# Patient Record
Sex: FEMALE | Race: WHITE | Employment: OTHER | URBAN - METROPOLITAN AREA
[De-identification: names, ages, dates, MRNs, and addresses within clinical notes are randomized per-mention and may not be internally consistent; named-entity substitution may affect disease eponyms.]

---

## 2017-04-17 ENCOUNTER — HOSPITAL ENCOUNTER (EMERGENCY)
Facility: CLINIC | Age: 37
Discharge: HOME | End: 2017-04-17
Attending: FAMILY MEDICINE | Admitting: FAMILY MEDICINE
Payer: MEDICARE

## 2017-04-17 ENCOUNTER — APPOINTMENT (OUTPATIENT)
Dept: CT IMAGING | Facility: CLINIC | Age: 37
End: 2017-04-17
Attending: FAMILY MEDICINE
Payer: MEDICARE

## 2017-04-17 VITALS
RESPIRATION RATE: 16 BRPM | WEIGHT: 142 LBS | TEMPERATURE: 98.6 F | DIASTOLIC BLOOD PRESSURE: 85 MMHG | SYSTOLIC BLOOD PRESSURE: 122 MMHG | OXYGEN SATURATION: 100 % | BODY MASS INDEX: 23.63 KG/M2

## 2017-04-17 DIAGNOSIS — K50.819 CROHN'S DISEASE OF SMALL AND LARGE INTESTINES WITH COMPLICATION (H): ICD-10-CM

## 2017-04-17 LAB
ALBUMIN SERPL-MCNC: 3.2 G/DL (ref 3.4–5)
ALBUMIN UR-MCNC: NEGATIVE MG/DL
ALP SERPL-CCNC: 85 U/L (ref 40–150)
ALT SERPL W P-5'-P-CCNC: 26 U/L (ref 0–50)
AMORPH CRY #/AREA URNS HPF: ABNORMAL /HPF
ANION GAP SERPL CALCULATED.3IONS-SCNC: 3 MMOL/L (ref 3–14)
APPEARANCE UR: CLEAR
AST SERPL W P-5'-P-CCNC: 16 U/L (ref 0–45)
BASOPHILS # BLD AUTO: 0 10E9/L (ref 0–0.2)
BASOPHILS NFR BLD AUTO: 0.4 %
BILIRUB SERPL-MCNC: 0.3 MG/DL (ref 0.2–1.3)
BILIRUB UR QL STRIP: NEGATIVE
BUN SERPL-MCNC: 13 MG/DL (ref 7–30)
CALCIUM SERPL-MCNC: 8.7 MG/DL (ref 8.5–10.1)
CHLORIDE SERPL-SCNC: 106 MMOL/L (ref 94–109)
CO2 SERPL-SCNC: 31 MMOL/L (ref 20–32)
COLOR UR AUTO: YELLOW
CREAT SERPL-MCNC: 0.63 MG/DL (ref 0.52–1.04)
CRP SERPL-MCNC: 7.8 MG/L (ref 0–8)
DIFFERENTIAL METHOD BLD: NORMAL
EOSINOPHIL # BLD AUTO: 0.2 10E9/L (ref 0–0.7)
EOSINOPHIL NFR BLD AUTO: 3.3 %
ERYTHROCYTE [DISTWIDTH] IN BLOOD BY AUTOMATED COUNT: 12.4 % (ref 10–15)
ERYTHROCYTE [SEDIMENTATION RATE] IN BLOOD BY WESTERGREN METHOD: 14 MM/H (ref 0–20)
GFR SERPL CREATININE-BSD FRML MDRD: ABNORMAL ML/MIN/1.7M2
GLUCOSE SERPL-MCNC: 96 MG/DL (ref 70–99)
GLUCOSE UR STRIP-MCNC: NEGATIVE MG/DL
HCG UR QL: NEGATIVE
HCT VFR BLD AUTO: 37.9 % (ref 35–47)
HGB BLD-MCNC: 12.6 G/DL (ref 11.7–15.7)
HGB UR QL STRIP: NEGATIVE
IMM GRANULOCYTES # BLD: 0 10E9/L (ref 0–0.4)
IMM GRANULOCYTES NFR BLD: 0.1 %
KETONES UR STRIP-MCNC: NEGATIVE MG/DL
LEUKOCYTE ESTERASE UR QL STRIP: NEGATIVE
LIPASE SERPL-CCNC: 106 U/L (ref 73–393)
LYMPHOCYTES # BLD AUTO: 2.8 10E9/L (ref 0.8–5.3)
LYMPHOCYTES NFR BLD AUTO: 38.5 %
MCH RBC QN AUTO: 30.6 PG (ref 26.5–33)
MCHC RBC AUTO-ENTMCNC: 33.2 G/DL (ref 31.5–36.5)
MCV RBC AUTO: 92 FL (ref 78–100)
MONOCYTES # BLD AUTO: 0.7 10E9/L (ref 0–1.3)
MONOCYTES NFR BLD AUTO: 9 %
MUCOUS THREADS #/AREA URNS LPF: PRESENT /LPF
NEUTROPHILS # BLD AUTO: 3.6 10E9/L (ref 1.6–8.3)
NEUTROPHILS NFR BLD AUTO: 48.7 %
NITRATE UR QL: NEGATIVE
PH UR STRIP: 7 PH (ref 5–7)
PLATELET # BLD AUTO: 291 10E9/L (ref 150–450)
POTASSIUM SERPL-SCNC: 4 MMOL/L (ref 3.4–5.3)
PROT SERPL-MCNC: 7 G/DL (ref 6.8–8.8)
RBC # BLD AUTO: 4.12 10E12/L (ref 3.8–5.2)
RBC #/AREA URNS AUTO: 2 /HPF (ref 0–2)
SODIUM SERPL-SCNC: 140 MMOL/L (ref 133–144)
SP GR UR STRIP: 1.01 (ref 1–1.03)
SQUAMOUS #/AREA URNS AUTO: 2 /HPF (ref 0–1)
URN SPEC COLLECT METH UR: ABNORMAL
UROBILINOGEN UR STRIP-MCNC: NORMAL MG/DL (ref 0–2)
WBC # BLD AUTO: 7.3 10E9/L (ref 4–11)
WBC #/AREA URNS AUTO: 1 /HPF (ref 0–2)

## 2017-04-17 PROCEDURE — 99284 EMERGENCY DEPT VISIT MOD MDM: CPT | Performed by: FAMILY MEDICINE

## 2017-04-17 PROCEDURE — 25000125 ZZHC RX 250: Performed by: FAMILY MEDICINE

## 2017-04-17 PROCEDURE — 25000128 H RX IP 250 OP 636: Performed by: FAMILY MEDICINE

## 2017-04-17 PROCEDURE — 85652 RBC SED RATE AUTOMATED: CPT | Performed by: FAMILY MEDICINE

## 2017-04-17 PROCEDURE — 81001 URINALYSIS AUTO W/SCOPE: CPT | Performed by: FAMILY MEDICINE

## 2017-04-17 PROCEDURE — 81025 URINE PREGNANCY TEST: CPT | Performed by: FAMILY MEDICINE

## 2017-04-17 PROCEDURE — 96374 THER/PROPH/DIAG INJ IV PUSH: CPT

## 2017-04-17 PROCEDURE — 80053 COMPREHEN METABOLIC PANEL: CPT | Performed by: FAMILY MEDICINE

## 2017-04-17 PROCEDURE — 86140 C-REACTIVE PROTEIN: CPT | Performed by: FAMILY MEDICINE

## 2017-04-17 PROCEDURE — 85025 COMPLETE CBC W/AUTO DIFF WBC: CPT | Performed by: FAMILY MEDICINE

## 2017-04-17 PROCEDURE — 83690 ASSAY OF LIPASE: CPT | Performed by: FAMILY MEDICINE

## 2017-04-17 PROCEDURE — 99285 EMERGENCY DEPT VISIT HI MDM: CPT | Mod: 25

## 2017-04-17 PROCEDURE — 25500064 ZZH RX 255 OP 636: Performed by: FAMILY MEDICINE

## 2017-04-17 PROCEDURE — 74177 CT ABD & PELVIS W/CONTRAST: CPT

## 2017-04-17 RX ORDER — ONDANSETRON 2 MG/ML
4 INJECTION INTRAMUSCULAR; INTRAVENOUS EVERY 30 MIN PRN
Status: DISCONTINUED | OUTPATIENT
Start: 2017-04-17 | End: 2017-04-18 | Stop reason: HOSPADM

## 2017-04-17 RX ORDER — SODIUM CHLORIDE 9 MG/ML
1000 INJECTION, SOLUTION INTRAVENOUS CONTINUOUS
Status: DISCONTINUED | OUTPATIENT
Start: 2017-04-17 | End: 2017-04-18 | Stop reason: HOSPADM

## 2017-04-17 RX ORDER — IOPAMIDOL 755 MG/ML
64 INJECTION, SOLUTION INTRAVASCULAR ONCE
Status: COMPLETED | OUTPATIENT
Start: 2017-04-17 | End: 2017-04-17

## 2017-04-17 RX ADMIN — IOPAMIDOL 64 ML: 755 INJECTION, SOLUTION INTRAVENOUS at 20:37

## 2017-04-17 RX ADMIN — SODIUM CHLORIDE 60 ML: 9 INJECTION, SOLUTION INTRAVENOUS at 20:37

## 2017-04-17 RX ADMIN — ONDANSETRON 4 MG: 2 INJECTION INTRAMUSCULAR; INTRAVENOUS at 19:36

## 2017-04-17 RX ADMIN — SODIUM CHLORIDE 1000 ML: 9 INJECTION, SOLUTION INTRAVENOUS at 19:34

## 2017-04-17 ASSESSMENT — ENCOUNTER SYMPTOMS
SHORTNESS OF BREATH: 0
DIAPHORESIS: 0
ABDOMINAL PAIN: 1
SORE THROAT: 0
CHILLS: 0
NAUSEA: 1
SINUS PRESSURE: 0
HEADACHES: 0
DYSURIA: 0
PALPITATIONS: 0
CONSTIPATION: 0
VOMITING: 1
BLOOD IN STOOL: 0
FEVER: 0
DIARRHEA: 1
WHEEZING: 0
COUGH: 0
FREQUENCY: 0

## 2017-04-18 NOTE — DOWNTIME EVENT NOTE
The EMR was down for 9 hours on 4/18/2017.    Cristina LAWTON RN was responsible for completing the paper charting during this time period.     The following information was re-entered into the system by Elizabeth Lopez: documented before downtime    The following information will remain in the paper chart:  Nursing documentation, MD orders, medication given, discharge instructions    Elizabeth Lopez  4/18/2017

## 2017-04-18 NOTE — ED PROVIDER NOTES
History     Chief Complaint   Patient presents with     Abdominal Pain     started 3 days ago     Nausea, Vomiting, & Diarrhea     HPI  Kim Harvey is a 37 year old female who presents with a history of Crohn's disease with at least 7 prior bowel resections and has previously been seen here multiple times for related abdominal pain as well as at [facility] and [facility].  Last visit here was approximately 1 year ago when she underwent CT demonstrating ileitis.  She also had an evaluation in this past December at [facility] when they identified a ovarian cyst moderate size.  She has not had follow-up on that.  She has not seen a GI doctor in at least 4 years and no recent colonoscopies.  She is on no controller  medications for her Crohn's disease.  Over the last 2 weeks she has had diarrhea frequent and not bloody.  She's been tolerating food and fluids during the day but at night time she has emesis.  She's had increasing associated abdominal distention  She has had nighttime stool incontinence but no daytime incontinence.  She denies associated fever.  There's been no dysuria urgency frequency or hematuria.  Prior cholecystectomy but she is unaware if she's had a appendectomy - it is however listed in her past history.  Past Surgical History:   Procedure Laterality Date     ABDOMEN SURGERY       APPENDECTOMY      lap     C REMOVAL GALLBLADDER  2001     C REMOVE MESH FROM ABD WALL FOR INFECTION       CHOLECYSTECTOMY       GYN SURGERY      cryosurgery     HERNIA REPAIR  3/2012    with Proceed mesh     PE TUBES  1989     SIGMOIDOSCOPY,BIOPSY  2/7/2012     SMALL INTESTINE SURGERY      ileocecectomy x2     TONSILLECTOMY  1995     TONSILLECTOMY         Patient Active Problem List   Diagnosis     Family hx-breast malignancy     Smoker     Bronchitis, acute     Crohn's disease (H)     Heterozygous factor V Leiden mutation     MTHFR mutation     Exacerbation of Crohn's disease, other complication (H)     Other  diseases of thymus (H)     Current Outpatient Prescriptions   Medication Sig Dispense Refill     predniSONE (DELTASONE) 20 MG tablet 3 tabs daily for 3 days, then 2 tabs daily for 3 days, then 1 tab daily for 3 days then 1/2 tab daily for 3 days. 20 tablet 0     HYDROmorphone (DILAUDID) 2 MG tablet Take 1 tablet (2 mg) by mouth every 4 hours as needed for moderate to severe pain 20 tablet 0     varenicline (CHANTIX STARTING MONTH PAK) 0.5 MG X 11 & 1 MG X 42 tablet Take 0.5 mg tab daily for 3 days, then 0.5 mg tab twice daily for 4 days, then 1 mg twice daily. 53 tablet 0     Amphetamine-Dextroamphetamine (ADDERALL XR PO) Take 50 mg by mouth daily           I have reviewed the Medications, Allergies, Past Medical and Surgical History, and Social History in the Epic system.    Review of Systems   Constitutional: Negative for chills, diaphoresis and fever.   HENT: Negative for ear pain, sinus pressure and sore throat.    Eyes: Negative for visual disturbance.   Respiratory: Negative for cough, shortness of breath and wheezing.    Cardiovascular: Negative for chest pain and palpitations.   Gastrointestinal: Positive for abdominal pain, diarrhea, nausea and vomiting. Negative for blood in stool and constipation.   Genitourinary: Negative for dysuria, frequency and urgency.   Skin: Negative for rash.   Neurological: Negative for headaches.   All other systems reviewed and are negative.        Physical Exam   BP: 122/85  Heart Rate: 79  Temp: 98.6  F (37  C)  Resp: 16  Weight: 64.4 kg (142 lb)  SpO2: 100 %  Physical Exam   Constitutional: She appears distressed.   HENT:   Mouth/Throat: Oropharynx is clear and moist.   Eyes: Conjunctivae are normal.   Cardiovascular: Normal rate.  Exam reveals no gallop and no friction rub.    No murmur heard.  Pulmonary/Chest: Effort normal and breath sounds normal. No respiratory distress. She has no wheezes. She has no rales.   Abdominal: Soft. She exhibits no distension. There is no  hepatosplenomegaly. There is tenderness in the right lower quadrant and periumbilical area. There is no rebound, no guarding and no CVA tenderness.   Musculoskeletal: She exhibits no edema.   Neurological: She is alert.   Skin: No rash noted. She is not diaphoretic.       ED Course     ED Course     Procedures            Critical Care time:  none               Results for orders placed or performed during the hospital encounter of 04/17/17   CT Abdomen Pelvis w Contrast    Narrative    CT ABDOMEN PELVIS WITH CONTRAST 4/17/2017 8:40 PM     HISTORY: Abdominal pain, Crohn's.    CONTRAST DOSE:  64 mL Isovue-[id number]    Radiation dose for this scan was reduced using automated exposure  control, adjustment of the mA and/or kV according to patient size, or  iterative reconstruction technique.    FINDINGS:  Surgical sutures are noted within the ascending colon.  Caudal to the sutures, there is marked bowel wall thickening which  includes the cecum and distal ileum. There is no definitive evidence  of bowel obstruction. Prominent fecal debris is noted throughout the  colon. There is no free peritoneal fluid or air. The liver, spleen,  kidneys, adrenal glands, and pancreas appear within normal limits.  There has been a cholecystectomy. Pelvic contents are otherwise  unremarkable.      Impression    IMPRESSION: Marked wall thickening of the ascending colon and distal  ileum presumably related to the stated history of Crohn's disease. No  free peritoneal fluid or air. No evidence of bowel obstruction.     BRAD BIANCHI MD   CBC with platelets differential   Result Value Ref Range    WBC 7.3 4.0 - 11.0 10e9/L    RBC Count 4.12 3.8 - 5.2 10e12/L    Hemoglobin 12.6 11.7 - 15.7 g/dL    Hematocrit 37.9 35.0 - 47.0 %    MCV 92 78 - 100 fl    MCH 30.6 26.5 - 33.0 pg    MCHC 33.2 31.5 - 36.5 g/dL    RDW 12.4 10.0 - 15.0 %    Platelet Count 291 150 - 450 10e9/L    Diff Method Automated Method     % Neutrophils 48.7 %    % Lymphocytes 38.5 %     % Monocytes 9.0 %    % Eosinophils 3.3 %    % Basophils 0.4 %    % Immature Granulocytes 0.1 %    Absolute Neutrophil 3.6 1.6 - 8.3 10e9/L    Absolute Lymphocytes 2.8 0.8 - 5.3 10e9/L    Absolute Monocytes 0.7 0.0 - 1.3 10e9/L    Absolute Eosinophils 0.2 0.0 - 0.7 10e9/L    Absolute Basophils 0.0 0.0 - 0.2 10e9/L    Abs Immature Granulocytes 0.0 0 - 0.4 10e9/L   Comprehensive metabolic panel   Result Value Ref Range    Sodium 140 133 - 144 mmol/L    Potassium 4.0 3.4 - 5.3 mmol/L    Chloride 106 94 - 109 mmol/L    Carbon Dioxide 31 20 - 32 mmol/L    Anion Gap 3 3 - 14 mmol/L    Glucose 96 70 - 99 mg/dL    Urea Nitrogen 13 7 - 30 mg/dL    Creatinine 0.63 0.52 - 1.04 mg/dL    GFR Estimate >90  Non  GFR Calc   >60 mL/min/1.7m2    GFR Estimate If Black >90   GFR Calc   >60 mL/min/1.7m2    Calcium 8.7 8.5 - 10.1 mg/dL    Bilirubin Total 0.3 0.2 - 1.3 mg/dL    Albumin 3.2 (L) 3.4 - 5.0 g/dL    Protein Total 7.0 6.8 - 8.8 g/dL    Alkaline Phosphatase 85 40 - 150 U/L    ALT 26 0 - 50 U/L    AST 16 0 - 45 U/L   Lipase   Result Value Ref Range    Lipase 106 73 - 393 U/L   Erythrocyte sedimentation rate auto   Result Value Ref Range    Sed Rate 14 0 - 20 mm/h   CRP inflammation   Result Value Ref Range    CRP Inflammation 7.8 0.0 - 8.0 mg/L   UA with Microscopic   Result Value Ref Range    Color Urine Yellow     Appearance Urine Clear     Glucose Urine Negative NEG mg/dL    Bilirubin Urine Negative NEG    Ketones Urine Negative NEG mg/dL    Specific Gravity Urine 1.014 1.003 - 1.035    Blood Urine Negative NEG    pH Urine 7.0 5.0 - 7.0 pH    Protein Albumin Urine Negative NEG mg/dL    Urobilinogen mg/dL Normal 0.0 - 2.0 mg/dL    Nitrite Urine Negative NEG    Leukocyte Esterase Urine Negative NEG    Source Midstream Urine     WBC Urine 1 0 - 2 /HPF    RBC Urine 2 0 - 2 /HPF    Squamous Epithelial /HPF Urine 2 (H) 0 - 1 /HPF    Mucous Urine Present (A) NEG /LPF    Amorphous Crystals Few (A) NEG  /HPF   HCG qualitative urine   Result Value Ref Range    HCG Qual Urine Negative NEG         Assessments & Plan (with Medical Decision Making)     MDM: Kim Harvey is a 37 year old female  Who presented with a history of Crohn's disease that she is not had follow-up for at least four years. She is not on prophylactic agents.  She has not had a recent colonoscopy. She has yet to follow up with NOHEMY but has an appointment scheduled for mid May.  Findings on CT are suggestive of both colitis and ileitis related to her Crohn's with a flair. She has no bleeding and is non-toxic in appearance. She's able to tolerate food and fluids and does not have An obstruction.  I spoke with NOHEMY on call at the [facility] who Recommended prednisone with follow-up with GAYATRI. I've given her prednisone taper as directed by NOHEMY, prednisone 40 mg daily for two weeks, then 30 mg daily for one week, then 20 mg daily for one week, Then 15 mg daily for one week,  then 10 mg daily for one week then 5 mg Daily for one week.  She will also perform first SKYLER stool testing and C. diff.  She's given precautions for return.      Her note was initiated in the epic system but then there was Epic downtime For approximately 10 hours. All of her discharge paperwork was done on paper. Please see her discharge paperwork.        I have reviewed the nursing notes.    I have reviewed the findings, diagnosis, plan and need for follow up with the patient.    New Prescriptions    No medications on file       Final diagnoses:   Crohn's disease of small and large intestines with complication (H)       4/17/2017   [facility] EMERGENCY DEPARTMENTonuncontrollable     Werner Winters MD  04/18/17 [id number]

## 2018-11-23 ENCOUNTER — APPOINTMENT (OUTPATIENT)
Dept: CT IMAGING | Facility: CLINIC | Age: 38
End: 2018-11-23
Attending: FAMILY MEDICINE
Payer: MEDICARE

## 2018-11-23 ENCOUNTER — HOSPITAL ENCOUNTER (EMERGENCY)
Facility: CLINIC | Age: 38
Discharge: HOME | End: 2018-11-23
Attending: FAMILY MEDICINE | Admitting: FAMILY MEDICINE
Payer: MEDICARE

## 2018-11-23 VITALS
SYSTOLIC BLOOD PRESSURE: 97 MMHG | TEMPERATURE: 98.6 F | DIASTOLIC BLOOD PRESSURE: 57 MMHG | HEART RATE: 72 BPM | HEIGHT: 65 IN | BODY MASS INDEX: 22.16 KG/M2 | OXYGEN SATURATION: 89 % | WEIGHT: 133 LBS | RESPIRATION RATE: 18 BRPM

## 2018-11-23 DIAGNOSIS — K50.119 CROHN'S DISEASE OF COLON WITH COMPLICATION (H): ICD-10-CM

## 2018-11-23 LAB
ALBUMIN SERPL-MCNC: 3.2 G/DL (ref 3.4–5)
ALBUMIN UR-MCNC: NEGATIVE MG/DL
ALP SERPL-CCNC: 134 U/L (ref 40–150)
ALT SERPL W P-5'-P-CCNC: 43 U/L (ref 0–50)
ANION GAP SERPL CALCULATED.3IONS-SCNC: 7 MMOL/L (ref 3–14)
APPEARANCE UR: ABNORMAL
AST SERPL W P-5'-P-CCNC: 30 U/L (ref 0–45)
BASOPHILS # BLD AUTO: 0 10E9/L (ref 0–0.2)
BASOPHILS NFR BLD AUTO: 0.6 %
BILIRUB SERPL-MCNC: 0.5 MG/DL (ref 0.2–1.3)
BILIRUB UR QL STRIP: ABNORMAL
BUN SERPL-MCNC: 20 MG/DL (ref 7–30)
CALCIUM SERPL-MCNC: 8.1 MG/DL (ref 8.5–10.1)
CHLORIDE SERPL-SCNC: 106 MMOL/L (ref 94–109)
CO2 SERPL-SCNC: 25 MMOL/L (ref 20–32)
COLOR UR AUTO: ABNORMAL
CREAT SERPL-MCNC: 0.6 MG/DL (ref 0.52–1.04)
CRP SERPL-MCNC: 3.1 MG/L (ref 0–8)
DIFFERENTIAL METHOD BLD: NORMAL
EOSINOPHIL # BLD AUTO: 0.1 10E9/L (ref 0–0.7)
EOSINOPHIL NFR BLD AUTO: 1 %
ERYTHROCYTE [DISTWIDTH] IN BLOOD BY AUTOMATED COUNT: 12.3 % (ref 10–15)
ERYTHROCYTE [SEDIMENTATION RATE] IN BLOOD BY WESTERGREN METHOD: 15 MM/H (ref 0–20)
GFR SERPL CREATININE-BSD FRML MDRD: >90 ML/MIN/1.7M2
GLUCOSE SERPL-MCNC: 90 MG/DL (ref 70–99)
GLUCOSE UR STRIP-MCNC: NEGATIVE MG/DL
HCG SERPL QL: NEGATIVE
HCT VFR BLD AUTO: 41.9 % (ref 35–47)
HGB BLD-MCNC: 13.7 G/DL (ref 11.7–15.7)
HGB UR QL STRIP: NEGATIVE
IMM GRANULOCYTES # BLD: 0 10E9/L (ref 0–0.4)
IMM GRANULOCYTES NFR BLD: 0.3 %
KETONES UR STRIP-MCNC: 5 MG/DL
LEUKOCYTE ESTERASE UR QL STRIP: NEGATIVE
LIPASE SERPL-CCNC: 45 U/L (ref 73–393)
LYMPHOCYTES # BLD AUTO: 1.7 10E9/L (ref 0.8–5.3)
LYMPHOCYTES NFR BLD AUTO: 24.3 %
MCH RBC QN AUTO: 30.8 PG (ref 26.5–33)
MCHC RBC AUTO-ENTMCNC: 32.7 G/DL (ref 31.5–36.5)
MCV RBC AUTO: 94 FL (ref 78–100)
MONOCYTES # BLD AUTO: 0.5 10E9/L (ref 0–1.3)
MONOCYTES NFR BLD AUTO: 6.4 %
MUCOUS THREADS #/AREA URNS LPF: PRESENT /LPF
NEUTROPHILS # BLD AUTO: 4.8 10E9/L (ref 1.6–8.3)
NEUTROPHILS NFR BLD AUTO: 67.4 %
NITRATE UR QL: NEGATIVE
NRBC # BLD AUTO: 0 10*3/UL
NRBC BLD AUTO-RTO: 0 /100
PH UR STRIP: 5 PH (ref 5–7)
PLATELET # BLD AUTO: 324 10E9/L (ref 150–450)
POTASSIUM SERPL-SCNC: 3.5 MMOL/L (ref 3.4–5.3)
PROT SERPL-MCNC: 7 G/DL (ref 6.8–8.8)
RBC # BLD AUTO: 4.45 10E12/L (ref 3.8–5.2)
RBC #/AREA URNS AUTO: 2 /HPF (ref 0–2)
SODIUM SERPL-SCNC: 138 MMOL/L (ref 133–144)
SOURCE: ABNORMAL
SP GR UR STRIP: 1.03 (ref 1–1.03)
SQUAMOUS #/AREA URNS AUTO: 2 /HPF (ref 0–1)
UROBILINOGEN UR STRIP-MCNC: 2 MG/DL (ref 0–2)
WBC # BLD AUTO: 7.1 10E9/L (ref 4–11)
WBC #/AREA URNS AUTO: 3 /HPF (ref 0–5)

## 2018-11-23 PROCEDURE — 25000128 H RX IP 250 OP 636: Performed by: FAMILY MEDICINE

## 2018-11-23 PROCEDURE — 25000125 ZZHC RX 250: Performed by: FAMILY MEDICINE

## 2018-11-23 PROCEDURE — 99285 EMERGENCY DEPT VISIT HI MDM: CPT | Mod: 25 | Performed by: FAMILY MEDICINE

## 2018-11-23 PROCEDURE — 99285 EMERGENCY DEPT VISIT HI MDM: CPT | Mod: Z6 | Performed by: FAMILY MEDICINE

## 2018-11-23 PROCEDURE — 96361 HYDRATE IV INFUSION ADD-ON: CPT | Performed by: FAMILY MEDICINE

## 2018-11-23 PROCEDURE — 80053 COMPREHEN METABOLIC PANEL: CPT | Performed by: EMERGENCY MEDICINE

## 2018-11-23 PROCEDURE — 81003 URINALYSIS AUTO W/O SCOPE: CPT | Performed by: EMERGENCY MEDICINE

## 2018-11-23 PROCEDURE — 74177 CT ABD & PELVIS W/CONTRAST: CPT

## 2018-11-23 PROCEDURE — 85025 COMPLETE CBC W/AUTO DIFF WBC: CPT | Performed by: EMERGENCY MEDICINE

## 2018-11-23 PROCEDURE — 84703 CHORIONIC GONADOTROPIN ASSAY: CPT | Performed by: FAMILY MEDICINE

## 2018-11-23 PROCEDURE — 83690 ASSAY OF LIPASE: CPT | Performed by: FAMILY MEDICINE

## 2018-11-23 PROCEDURE — 85652 RBC SED RATE AUTOMATED: CPT | Performed by: FAMILY MEDICINE

## 2018-11-23 PROCEDURE — 96374 THER/PROPH/DIAG INJ IV PUSH: CPT | Mod: 59 | Performed by: FAMILY MEDICINE

## 2018-11-23 PROCEDURE — 25000128 H RX IP 250 OP 636: Performed by: EMERGENCY MEDICINE

## 2018-11-23 PROCEDURE — 86140 C-REACTIVE PROTEIN: CPT | Performed by: FAMILY MEDICINE

## 2018-11-23 PROCEDURE — 25000128 H RX IP 250 OP 636

## 2018-11-23 RX ORDER — SODIUM CHLORIDE 9 MG/ML
1000 INJECTION, SOLUTION INTRAVENOUS CONTINUOUS
Status: DISCONTINUED | OUTPATIENT
Start: 2018-11-23 | End: 2018-11-24 | Stop reason: HOSPADM

## 2018-11-23 RX ORDER — ONDANSETRON 2 MG/ML
INJECTION INTRAMUSCULAR; INTRAVENOUS
Status: COMPLETED
Start: 2018-11-23 | End: 2018-11-23

## 2018-11-23 RX ORDER — PREDNISONE 10 MG/1
TABLET ORAL
Qty: 106 TABLET | Refills: 0 | Status: SHIPPED | OUTPATIENT
Start: 2018-11-23 | End: 2018-11-29

## 2018-11-23 RX ORDER — IBUPROFEN 200 MG
400 TABLET ORAL EVERY 6 HOURS PRN
COMMUNITY

## 2018-11-23 RX ORDER — IOPAMIDOL 755 MG/ML
65 INJECTION, SOLUTION INTRAVASCULAR ONCE
Status: COMPLETED | OUTPATIENT
Start: 2018-11-23 | End: 2018-11-23

## 2018-11-23 RX ORDER — ONDANSETRON 2 MG/ML
4 INJECTION INTRAMUSCULAR; INTRAVENOUS ONCE
Status: COMPLETED | OUTPATIENT
Start: 2018-11-23 | End: 2018-11-23

## 2018-11-23 RX ADMIN — SODIUM CHLORIDE 1000 ML: 9 INJECTION, SOLUTION INTRAVENOUS at 17:47

## 2018-11-23 RX ADMIN — ONDANSETRON 4 MG: 2 INJECTION INTRAMUSCULAR; INTRAVENOUS at 17:52

## 2018-11-23 RX ADMIN — SODIUM CHLORIDE 56 ML: 9 INJECTION, SOLUTION INTRAVENOUS at 18:52

## 2018-11-23 RX ADMIN — SODIUM CHLORIDE 1000 ML: 9 INJECTION, SOLUTION INTRAVENOUS at 18:46

## 2018-11-23 RX ADMIN — IOPAMIDOL 65 ML: 755 INJECTION, SOLUTION INTRAVENOUS at 18:51

## 2018-11-23 ASSESSMENT — ENCOUNTER SYMPTOMS
ABDOMINAL PAIN: 1
SORE THROAT: 0
SINUS PRESSURE: 0
DYSURIA: 0
FEVER: 0
HEADACHES: 0
VOMITING: 1
NAUSEA: 1
CONSTIPATION: 0
FREQUENCY: 0
BLOOD IN STOOL: 1
CHILLS: 0
PALPITATIONS: 0
DIARRHEA: 0
SHORTNESS OF BREATH: 0
WHEEZING: 0
DIAPHORESIS: 0
COUGH: 0

## 2018-11-23 NOTE — ED NOTES
R sided abd pain over the last week, hx of chrons, n/v/d, pain averaging 5/10 at worst it's a 10/10. Pt states this event feels worse then previous chron's in the past.

## 2018-11-23 NOTE — ED NOTES
Patient has Crohns and feels she may be having a flare up. She has abdominal pain, bloating, fatigue.

## 2018-11-23 NOTE — ED AVS SNAPSHOT
[facility]    [address] [facility] [address]    Phone:  [phone]    Fax:  [phone]                                       Kim Harvey   MRN: [id number]    Department:  [facility]   Date of Visit:  11/23/2018           Patient Information     Date Of Birth          1980        Your diagnoses for this visit were:     Crohn's disease of colon with complication (H) Take  prednisone 40 mg daily for two weeks, then 30 mg daily for one week, then 20 mg daily for one week, Then 15 mg daily for one week,  then 10 mg daily for one week then 5 mg Daily for one week.  Return for worsening, dehydration, increasing pain, distention. follow-up with [facility]. see referral.       You were seen by Werner Winters MD.      Follow-up Information     Follow up with No Ref-Primary, Physician In 1 week.        Follow up with [facility].    Specialty:  EMERGENCY MEDICINE    Why:  As needed, If symptoms worsen    Contact information:    [address] [place] [address] [phone]    Additional information:    The medical center is located at   [address] [place] [address] (between [address] in [place], [place] miles north   of [place]).        Schedule an appointment as soon as possible for a visit with GI.        Discharge Instructions         ICD-10-CM    1. Crohn's disease of colon with complication (H) K50.119 GASTROENTEROLOGY ADULT REF CONSULT ONLY    Take  prednisone 40 mg daily for two weeks, then 30 mg daily for one week, then 20 mg daily for one week, Then 15 mg daily for one week,  then 10 mg daily for one week then 5 mg Daily for one week.  Return for worsening, dehydration, increasing pain, distention. follow-up with [facility]. see referral.         Discharge Instructions for Crohn s Disease  You have been diagnosed with Crohn s disease. Your digestive tract is swollen and inflamed. All  "layers of your digestive tract may be affected. Although there is no cure for Crohn s disease, you can receive treatment for the symptoms. Help manage your symptoms by following your healthcare provider s advice and watching what you eat.  Home care  Recommendations for taking care of yourself at home include the following:    Work closely with your healthcare provider to determine the types of treatment that are best for you.    Take your medicines exactly as directed.  ? Let your healthcare provider know if you are having uncomfortable side effects.  ? Don t stop taking your medicines without talking with your healthcare provider first.    It may be helpful to avoid certain foods for a little while. Depending on your condition, these may include caffeine (coffee, tea, and cola), spicy foods, milk products, and raw fruits and vegetables. For certain people, these can be hard to digest and can worsen symptoms in a flare-up. Your healthcare provider may have you work with a nutritionist to come up with the best food choices for you.    Try eating several small meals a day instead of 3 large ones.    Don't smoke. Tobacco smoking makes the disease worse.     Keep appointments for regular checkups even if you are not having symptoms.    Talk to your healthcare provider about surgery for Crohn s disease. Surgery won t cure Crohn s disease, but it may help control the symptoms. Only you and your healthcare provider can decide if this choice is right for you.    Learn more about your condition. Contact the [facility] [employer] toll-free at [phone] or www.ccfa.org  Managing nutrition  You may be able to eat most foods until you have a flare-up. But like anyone else, you need to make healthy eating choices. Some of the healthiest foods can make symptoms worse, though. Keeping track of your \"problem foods\" may be helpful. Ask your healthcare provider any questions you have about healthy eating.     When to call " your healthcare provider  Call your healthcare provider right away if you have any of the following:    Severe pain or bloating in your belly after meals    Sores in your mouth    Sores in your anal area (around your rectum)    Fever of 100.4 F (38 C) or higher, or as directed by your healthcare provider    Poor appetite or weight loss    Bloody diarrhea    Nausea or vomiting    Skin rashes or skin that weeps (or drains)    Changes in your vision   Date Last Reviewed: 8/1/2016 2000-2018 [employer]. [address] [place] [address]. All rights reserved. This information is not intended as a substitute for professional medical care. Always follow your healthcare professional's instructions.          24 Hour Appointment Hotline       To make an appointment at any [facility] call [phone] ([phone]). If you don't have a family doctor or clinic, we will help you find one. [facility] clinics are conveniently located to serve the needs of you and your family.          ED Discharge Orders     GASTROENTEROLOGY ADULT REF CONSULT ONLY       Preferred Location: [facility], [facility]: [phone]      Please be aware that coverage of these services is subject to the terms and limitations of your health insurance plan.  Call member services at your health plan with any benefit or coverage questions.  Any procedures must be performed at a [facility] facility OR coordinated by your clinic's referral office.    Please bring the following with you to your appointment:    (1) Any X-Rays, CTs or MRIs which have been performed.  Contact the facility where they were done to arrange for  prior to your scheduled appointment.    (2) List of current medications   (3) This referral request   (4) Any documents/labs given to you for this referral                     Review of your medicines      START taking        Dose / Directions Last dose taken    predniSONE 10 MG tablet   Commonly known  as:  DELTASONE   Quantity:  106 tablet        prednisone 40 mg daily for two weeks, then 30 mg daily for one week, then 20 mg daily for one week, Then 15 mg daily for one week,  then 10 mg daily for one week then 5 mg Daily for one week   Refills:  0          Our records show that you are taking the medicines listed below. If these are incorrect, please call your family doctor or clinic.        Dose / Directions Last dose taken    ADDERALL XR PO   Dose:  25 mg        Take 25 mg by mouth daily   Refills:  0        ALEVE PO   Dose:  440 mg        Take 440 mg by mouth at onset of headache for moderate pain   Refills:  0        ibuprofen 200 MG tablet   Commonly known as:  ADVIL/MOTRIN   Dose:  400 mg        Take 400 mg by mouth every 6 hours as needed for mild pain   Refills:  0                Prescriptions were sent or printed at these locations (1 Prescription)                   [facility] [address] [place] [address], [place] [address] [address] [facility] [address] [facility] [address]   [address] [facility] [place] [address]    Telephone:  [phone]   Fax:  [phone]   Hours:                  Printed at Department/Unit printer (1 of 1)         predniSONE (DELTASONE) 10 MG tablet                Procedures and tests performed during your visit     CBC with platelets differential    CRP Inflammation    CT Abdomen Pelvis w Contrast    Comprehensive metabolic panel    Erythrocyte sedimentation rate auto    HCG qualitative pregnancy (blood)    Lipase    UA reflex to Microscopic      Orders Needing Specimen Collection     None      Pending Results     No orders found from 11/21/2018 to 11/24/2018.            Pending Culture Results     No orders found from 11/21/2018 to 11/24/2018.            Pending Results Instructions     If you had any lab results that were not finalized at the time of your Discharge, you can call the ED Lab Result RN at [phone]. You will be contacted by this team for any positive Lab results or changes in  treatment. The nurses are available 7 days a week from 10A to 6:30P.  You can leave a message 24 hours per day and they will return your call.        Test Results From Your Hospital Stay        11/23/2018  5:09 PM      Component Results     Component Value Ref Range & Units Status    Sodium 138 133 - 144 mmol/L Final    Potassium 3.5 3.4 - 5.3 mmol/L Final    Chloride 106 94 - 109 mmol/L Final    Carbon Dioxide 25 20 - 32 mmol/L Final    Anion Gap 7 3 - 14 mmol/L Final    Glucose 90 70 - 99 mg/dL Final    Urea Nitrogen 20 7 - 30 mg/dL Final    Creatinine 0.60 0.52 - 1.04 mg/dL Final    GFR Estimate >90 >60 mL/min/1.7m2 Final    Non  GFR Calc    GFR Estimate If Black >90 >60 mL/min/1.7m2 Final    African American GFR Calc    Calcium 8.1 (L) 8.5 - 10.1 mg/dL Final    Bilirubin Total 0.5 0.2 - 1.3 mg/dL Final    Albumin 3.2 (L) 3.4 - 5.0 g/dL Final    Protein Total 7.0 6.8 - 8.8 g/dL Final    Alkaline Phosphatase 134 40 - 150 U/L Final    ALT 43 0 - 50 U/L Final    AST 30 0 - 45 U/L Final         11/23/2018  4:52 PM      Component Results     Component Value Ref Range & Units Status    WBC 7.1 4.0 - 11.0 10e9/L Final    RBC Count 4.45 3.8 - 5.2 10e12/L Final    Hemoglobin 13.7 11.7 - 15.7 g/dL Final    Hematocrit 41.9 35.0 - 47.0 % Final    MCV 94 78 - 100 fl Final    MCH 30.8 26.5 - 33.0 pg Final    MCHC 32.7 31.5 - 36.5 g/dL Final    RDW 12.3 10.0 - 15.0 % Final    Platelet Count 324 150 - 450 10e9/L Final    Diff Method Automated Method  Final    % Neutrophils 67.4 % Final    % Lymphocytes 24.3 % Final    % Monocytes 6.4 % Final    % Eosinophils 1.0 % Final    % Basophils 0.6 % Final    % Immature Granulocytes 0.3 % Final    Nucleated RBCs 0 0 /100 Final    Absolute Neutrophil 4.8 1.6 - 8.3 10e9/L Final    Absolute Lymphocytes 1.7 0.8 - 5.3 10e9/L Final    Absolute Monocytes 0.5 0.0 - 1.3 10e9/L Final    Absolute Eosinophils 0.1 0.0 - 0.7 10e9/L Final    Absolute Basophils 0.0 0.0 - 0.2 10e9/L Final     Abs Immature Granulocytes 0.0 0 - 0.4 10e9/L Final    Absolute Nucleated RBC 0.0  Final         11/23/2018  5:25 PM      Component Results     Component Value Ref Range & Units Status    Color Urine Beryl  Final    Appearance Urine Slightly Cloudy  Final    Glucose Urine Negative NEG^Negative mg/dL Final    Bilirubin Urine Small (A) NEG^Negative Final    This is an unconfirmed screening test result. A positive result may be false.    Ketones Urine 5 (A) NEG^Negative mg/dL Final    Specific Gravity Urine 1.030 1.003 - 1.035 Final    Blood Urine Negative NEG^Negative Final    pH Urine 5.0 5.0 - 7.0 pH Final    Protein Albumin Urine Negative NEG^Negative mg/dL Final    Urobilinogen mg/dL 2.0 0.0 - 2.0 mg/dL Final    Nitrite Urine Negative NEG^Negative Final    Leukocyte Esterase Urine Negative NEG^Negative Final    Source Midstream Urine  Final    RBC Urine 2 0 - 2 /HPF Final    WBC Urine 3 0 - 5 /HPF Final    Squamous Epithelial /HPF Urine 2 (H) 0 - 1 /HPF Final    Mucous Urine Present (A) NEG^Negative /LPF Final         11/23/2018  5:40 PM      Component Results     Component Value Ref Range & Units Status    HCG Qualitative Serum Negative NEG^Negative Final    This test is for screening purposes.  Results should be interpreted along with   the clinical picture.  Confirmation testing is available if warranted by   ordering [id number], HCG Quantitative Pregnancy.           11/23/2018  5:48 PM      Component Results     Component Value Ref Range & Units Status    Lipase 45 (L) 73 - 393 U/L Final         11/23/2018  8:28 PM      Narrative     CT ABDOMEN AND PELVIS WITH CONTRAST 11/23/2018 7:00 PM     HISTORY: Abdominal pain. Crohn's. Decreased BMI.    COMPARISON: April 17, 2017    TECHNIQUE: Volumetric helical acquisition of CT images from the lung  bases through the symphysis pubis after the administration of 65 mL  Isovue-370 intravenous contrast. Radiation dose for this scan was  reduced using automated exposure  control, adjustment of the mA and/or  kV according to patient size, or iterative reconstruction technique.    FINDINGS: Marked inflammatory change in the neoterminal ileum,  slightly worse than the comparison study compatible with acute on  chronic Crohn's disease. Marked dilatation of small bowel proximal to  this, also considerably worse than previous. Wall thickening is severe  for approximately 10 cm, less severe wall thickening extends probably  another 10-20 cm. No abscess. No free air. Low-attenuation  subcentimeter liver lesion(s) compatible with benign cysts or other  benign lesions. These are/is stable for at least one year and can be  considered benign. Kidneys, adrenal glands, spleen, pancreas  unremarkable. Normal caliber aorta. Trace peripheral fibrosis and/or  atelectasis in the lungs. No frankly destructive bony lesions.        Impression     IMPRESSION: Acute on chronic Crohn's disease, with severe inflammation  for the 10 cm involving the neoterminal ileum, with less severe wall  thickening affecting approximately another 10-20 cm and proximal small  bowel dilatation compatible with early or partial small bowel  obstruction.     LE STRINGER MD         11/23/2018  5:51 PM      Component Results     Component Value Ref Range & Units Status    Sed Rate 15 0 - 20 mm/h Final         11/23/2018  5:55 PM      Component Results     Component Value Ref Range & Units Status    CRP Inflammation 3.1 0.0 - 8.0 mg/L Final                Thank you for choosing [facility]       Thank you for choosing [facility] for your care. Our goal is always to provide you with excellent care. Hearing back from our patients is one way we can continue to improve our services. Please take a few minutes to complete the written survey that you may receive in the mail after you visit with us. Thank you!        [employer] Information     [employer] lets you send messages to your doctor, view your test results, renew your prescriptions,  "schedule appointments and more. To sign up, go to [email or web address] . Click on \"Log in\" on the left side of the screen, which will take you to the Welcome page. Then click on \"Sign up Now\" on the right side of the page.     You will be asked to enter the access code listed below, as well as some personal information. Please follow the directions to create your username and password.     Your access code is: [email or web address]  Expires: 2019 10:14 PM     Your access code will  in 90 days. If you need help or a new code, please call your [facility] clinic or [phone].        Care EveryWhere ID     This is your Care EveryWhere ID. This could be used by other organizations to access your [facility] medical records  [id number]        Equal Access to Services     TRACY BERMUDEZ : Kishan Johnson, garima montiel, natalia silveira, audrey huizar . [facility] [phone].    ATENCIÓN: Si habla español, tiene a mandel disposición servicios gratuitos de asistencia lingüística. Llame al [phone].    We comply with applicable federal civil rights laws and [place] laws. We do not discriminate on the basis of race, color, national origin, age, disability, sex, sexual orientation, or gender identity.            After Visit Summary       This is your record. Keep this with you and show to your community pharmacist(s) and doctor(s) at your next visit.                  "

## 2018-11-23 NOTE — ED PROVIDER NOTES
History     Chief Complaint   Patient presents with     Abdominal Pain     HX o Crohns   blood in stool  bloating   bowel irregualrity     pain      HPI  Kim Harvey is a 38 year old female who presents with a history of Crohn's disease and 7 prior bowel resections seen at various medical centers in the past including the [facility] but is not been seen by a primary provider in this region for some time.  She is on chronic Suboxone and Adderall and travels to 6 months to [place] to  her prescriptions.   She has had prior imaging including a visit where I saw her in 2017 and had signs of ileitis on imaging.  She has not been seen by GI in at least 5-6 years despite my evaluation in 2017 when I set her up with the [facility] and at that time had discussed with him starting a prednisone taper which was initiated.  He is not currently on any agents to control her Crohn's. s/p appe, albert.    She presents today with increasing GI symptoms since August.  However other than periodic abdominal pain she had not had stool change until last week when abdominal pain increased significantly and she developed more generalized abdominal pain especially the upper quadrants associated with nausea decreased p.o. intake, formed stool still but blood seen in the stool.  No black tarry stools.  There is no urinary frequency or urgency but there is frequent dysuria.  No foul odor or abnormal vaginal discharge.  She denies current pregnancy.  She is not on contraception.  Is also noted concurrent abdominal distention.  No alcohol.  Has quit prior tobacco.    Problem List:    Patient Active Problem List    Diagnosis Date Noted     Other diseases of thymus (H) 04/22/2016     Priority: Medium     Exacerbation of Crohn's disease, other complication (H) 10/26/2015     Priority: Medium     Heterozygous factor V Leiden mutation 03/25/2015     Priority: Medium     MTHFR mutation 03/25/2015     Priority: Medium      Crohn's disease (H) 03/23/2015     Priority: Medium     Family hx-breast malignancy 09/22/2008     Priority: Medium     (Problem list name updated by automated process. Provider to review and confirm.)       Smoker 09/22/2008     Priority: Medium     Bronchitis, acute 09/22/2008     Priority: Medium     (Problem list name updated by automated process. Provider to review and confirm.)          Past Medical History:    Past Medical History:   Diagnosis Date     1-5% of normal factor VIII (H)      Abscess of abdominal cavity (H)      Asthma      Chromosomal abnormality      Crohn disease (H)      Crohn's disease (H) 2003     Depression      Factor V Leiden (H)      Factor V Leiden mutation (H)      Factor V Leiden mutation complicating pregnancy (H)      Fibromyalgia      IBS (irritable bowel syndrome)      Migraines        Past Surgical History:    Past Surgical History:   Procedure Laterality Date     ABDOMEN SURGERY       APPENDECTOMY      lap     C REMOVAL GALLBLADDER  2001     C REMOVE MESH FROM ABD WALL FOR INFECTION       CHOLECYSTECTOMY       GYN SURGERY      cryosurgery     HERNIA REPAIR  3/2012    with Proceed mesh     PE TUBES  1989     SIGMOIDOSCOPY,BIOPSY  2/7/2012     SMALL INTESTINE SURGERY      ileocecectomy x2     TONSILLECTOMY  1995     TONSILLECTOMY         Family History:    Family History   Problem Relation Age of Onset     Hypertension Maternal Grandmother      HEART DISEASE Maternal Grandmother      Diabetes Maternal Grandmother      Other - See Comments Maternal Grandmother      seizures     Liver Disease Mother      Heart Surgery Mother 46     stents     Cerebrovascular Disease Mother      Cancer Maternal Uncle      lung and prostate     Diabetes Maternal Uncle      Other - See Comments Maternal Uncle      mental illness     Other Cancer Father      methasylioma     Cerebrovascular Disease Father      Crohn Disease Paternal Grandmother        Social History:  Marital Status:    "[4]  Social History   Substance Use Topics     Smoking status: Current Every Day Smoker     Packs/day: 0.50     Years: 12.00     Types: Cigarettes     Smokeless tobacco: Never Used      Comment: 12/23/15- taking chantix     Alcohol use No        Medications:      Amphetamine-Dextroamphetamine (ADDERALL XR PO)   ibuprofen (ADVIL/MOTRIN) 200 MG tablet   Naproxen Sodium (ALEVE PO)         Review of Systems   Constitutional: Negative for chills, diaphoresis and fever.   HENT: Negative for ear pain, sinus pressure and sore throat.    Eyes: Negative for visual disturbance.   Respiratory: Negative for cough, shortness of breath and wheezing.    Cardiovascular: Negative for chest pain and palpitations.   Gastrointestinal: Positive for abdominal pain, blood in stool, nausea and vomiting. Negative for constipation and diarrhea.   Genitourinary: Negative for dysuria, frequency and urgency.   Skin: Negative for rash.   Neurological: Negative for headaches.   All other systems reviewed and are negative.      Physical Exam   BP: 110/57  Pulse: 72  Temp: 98.6  F (37  C)  Resp: 18  Height: 165.1 cm (5' 5\")  Weight: 60.3 kg (133 lb)  SpO2: 98 %      Physical Exam   Constitutional: She appears distressed.   HENT:   Mouth/Throat: Oropharynx is clear and moist.   Eyes: Conjunctivae are normal.   Neck: Neck supple.   Cardiovascular: Normal rate and regular rhythm.  Exam reveals no gallop and no friction rub.    No murmur heard.  Pulmonary/Chest: Effort normal and breath sounds normal. No respiratory distress. She has no wheezes. She has no rales.   Abdominal: Soft. Bowel sounds are normal. She exhibits distension. There is no hepatosplenomegaly. There is tenderness in the right upper quadrant and epigastric area. There is guarding. There is no rebound and no CVA tenderness.   Musculoskeletal: She exhibits no edema.   Neurological: She is alert. She exhibits normal muscle tone.   Skin: No rash noted. She is not diaphoretic.       ED " Course     ED Course     Procedures               Critical Care time:  none               Results for orders placed or performed during the hospital encounter of 11/23/18 (from the past 24 hour(s))   Comprehensive metabolic panel   Result Value Ref Range    Sodium 138 133 - 144 mmol/L    Potassium 3.5 3.4 - 5.3 mmol/L    Chloride 106 94 - 109 mmol/L    Carbon Dioxide 25 20 - 32 mmol/L    Anion Gap 7 3 - 14 mmol/L    Glucose 90 70 - 99 mg/dL    Urea Nitrogen 20 7 - 30 mg/dL    Creatinine 0.60 0.52 - 1.04 mg/dL    GFR Estimate >90 >60 mL/min/1.7m2    GFR Estimate If Black >90 >60 mL/min/1.7m2    Calcium 8.1 (L) 8.5 - 10.1 mg/dL    Bilirubin Total 0.5 0.2 - 1.3 mg/dL    Albumin 3.2 (L) 3.4 - 5.0 g/dL    Protein Total 7.0 6.8 - 8.8 g/dL    Alkaline Phosphatase 134 40 - 150 U/L    ALT 43 0 - 50 U/L    AST 30 0 - 45 U/L   CBC with platelets differential   Result Value Ref Range    WBC 7.1 4.0 - 11.0 10e9/L    RBC Count 4.45 3.8 - 5.2 10e12/L    Hemoglobin 13.7 11.7 - 15.7 g/dL    Hematocrit 41.9 35.0 - 47.0 %    MCV 94 78 - 100 fl    MCH 30.8 26.5 - 33.0 pg    MCHC 32.7 31.5 - 36.5 g/dL    RDW 12.3 10.0 - 15.0 %    Platelet Count 324 150 - 450 10e9/L    Diff Method Automated Method     % Neutrophils 67.4 %    % Lymphocytes 24.3 %    % Monocytes 6.4 %    % Eosinophils 1.0 %    % Basophils 0.6 %    % Immature Granulocytes 0.3 %    Nucleated RBCs 0 0 /100    Absolute Neutrophil 4.8 1.6 - 8.3 10e9/L    Absolute Lymphocytes 1.7 0.8 - 5.3 10e9/L    Absolute Monocytes 0.5 0.0 - 1.3 10e9/L    Absolute Eosinophils 0.1 0.0 - 0.7 10e9/L    Absolute Basophils 0.0 0.0 - 0.2 10e9/L    Abs Immature Granulocytes 0.0 0 - 0.4 10e9/L    Absolute Nucleated RBC 0.0    UA reflex to Microscopic   Result Value Ref Range    Color Urine Beryl     Appearance Urine Slightly Cloudy     Glucose Urine Negative NEG^Negative mg/dL    Bilirubin Urine Small (A) NEG^Negative    Ketones Urine 5 (A) NEG^Negative mg/dL    Specific Gravity Urine 1.030 1.003 -  1.035    Blood Urine Negative NEG^Negative    pH Urine 5.0 5.0 - 7.0 pH    Protein Albumin Urine Negative NEG^Negative mg/dL    Urobilinogen mg/dL 2.0 0.0 - 2.0 mg/dL    Nitrite Urine Negative NEG^Negative    Leukocyte Esterase Urine Negative NEG^Negative    Source Midstream Urine     RBC Urine 2 0 - 2 /HPF    WBC Urine 3 0 - 5 /HPF    Squamous Epithelial /HPF Urine 2 (H) 0 - 1 /HPF    Mucous Urine Present (A) NEG^Negative /LPF   HCG qualitative pregnancy (blood)   Result Value Ref Range    HCG Qualitative Serum Negative NEG^Negative   Lipase   Result Value Ref Range    Lipase 45 (L) 73 - 393 U/L   Erythrocyte sedimentation rate auto   Result Value Ref Range    Sed Rate 15 0 - 20 mm/h   CRP Inflammation   Result Value Ref Range    CRP Inflammation 3.1 0.0 - 8.0 mg/L   CT Abdomen Pelvis w Contrast    Narrative    CT ABDOMEN AND PELVIS WITH CONTRAST 11/23/2018 7:00 PM     HISTORY: Abdominal pain. Crohn's. Decreased BMI.    COMPARISON: April 17, 2017    TECHNIQUE: Volumetric helical acquisition of CT images from the lung  bases through the symphysis pubis after the administration of 65 mL  Isovue-370 intravenous contrast. Radiation dose for this scan was  reduced using automated exposure control, adjustment of the mA and/or  kV according to patient size, or iterative reconstruction technique.    FINDINGS: Marked inflammatory change in the neoterminal ileum,  slightly worse than the comparison study compatible with acute on  chronic Crohn's disease. Marked dilatation of small bowel proximal to  this, also considerably worse than previous. Wall thickening is severe  for approximately 10 cm, less severe wall thickening extends probably  another 10-20 cm. No abscess. No free air. Low-attenuation  subcentimeter liver lesion(s) compatible with benign cysts or other  benign lesions. These are/is stable for at least one year and can be  considered benign. Kidneys, adrenal glands, spleen, pancreas  unremarkable. Normal caliber  "aorta. Trace peripheral fibrosis and/or  atelectasis in the lungs. No frankly destructive bony lesions.      Impression    IMPRESSION: Acute on chronic Crohn's disease, with severe inflammation  for the 10 cm involving the neoterminal ileum, with less severe wall  thickening affecting approximately another 10-20 cm and proximal small  bowel dilatation compatible with early or partial small bowel  obstruction.     LE STRINGER MD       Medications   0.9% sodium chloride BOLUS (not administered)     Followed by   sodium chloride 0.9% infusion (not administered)     Patient Vitals for the past 24 hrs:   BP Temp Pulse Resp SpO2 Height Weight   11/23/18 1830 97/57 - - - (!) 89 % - -   11/23/18 1800 96/60 - - - 98 % - -   11/23/18 1730 106/52 - - - - - -   11/23/18 1715 - - - - 99 % - -   11/23/18 1700 98/61 - - - 98 % - -   11/23/18 1630 108/64 - - - 100 % - -   11/23/18 1614 110/57 98.6  F (37  C) 72 18 98 % 1.651 m (5' 5\") 60.3 kg (133 lb)     last o2 sat was artifact and should not have been validated.    Assessments & Plan (with Medical Decision Making)     MDM: Kim Harvey is a 38 year old female who has a history of Crohn's disease with 7 prior bowel resections and previously followed by GI at the [facility] but in recent years has been off all disease modifying agents, does not have a primary care provider has not seen GI and more than 4-5 years, is prescribed Adderall and Suboxone in [place] where she travels every 6 months to get her prescriptions.    Her Crohn's has most recently been exacerbated since August but in the last week has progressed with more diffuse abdominal pain but still able to pass stools and tolerate food and fluids and is without fever.  Her vital signs are reassuring.  Her abdomen is soft with a general tenderness to palpation.  Her CT imaging demonstrates multiple regions of severe involvement of her Crohn's disease.  This appears worse than when I last saw the patient " in 2017.  At that time in the emergency department I had set her up for follow-up with GI and after consultation by phone at that time I started her on a prednisone taper.  At that time she had assured me that she would follow-up with GI but did not.  I asked her today if I were to set her up on prednisone which she reliably follow-up and then offered her various sites that would be accessible to her.  She can get to [place] from her home and is willing to see [facility] GI at that site.  d/w Dr. Ponce in GI [facility] who agreed with starting the prednisone.    I spoke with the patient and we discussed indications for emergent follow-up.  At this point she has no diarrhea or signs of infectious cause for her current symptoms.  These are consistent with her prior Crohn's.  She may require admission especially if she becomes obstructed.          I have reviewed the nursing notes.    I have reviewed the findings, diagnosis, plan and need for follow up with the patient.       New Prescriptions    No medications on file       Final diagnoses:   Crohn's disease of colon with complication (H) - Take  prednisone 40 mg daily for two weeks, then 30 mg daily for one week, then 20 mg daily for one week, Then 15 mg daily for one week,  then 10 mg daily for one week then 5 mg Daily for one week.  Return for worsening, dehydration, increasing pain, distention. follow-up with GI [facility]. see referral.       11/23/2018   [facility] EMERGENCY DEPARTMENT     Werner Winters MD  11/24/18 1110       Werner Winters MD  11/24/18 1111

## 2018-11-23 NOTE — ED AVS SNAPSHOT
[facility]    [address] [address]    Phone:  [phone]    Fax:  [phone]                                       Kim Harvey   MRN: [id number]    Department:  [facility] Emergency Department   Date of Visit:  11/23/2018           After Visit Summary Signature Page     I have received my discharge instructions, and my questions have been answered. I have discussed any challenges I see with this plan with the nurse or doctor.    ..........................................................................................................................................  Patient/Patient Representative Signature      ..........................................................................................................................................  Patient Representative Print Name and Relationship to Patient    ..................................................               ................................................  Date                                   Time    ..........................................................................................................................................  Reviewed by Signature/Title    ...................................................              ..............................................  Date                                               Time          22EPIC Rev 08/18

## 2018-11-24 NOTE — DISCHARGE INSTRUCTIONS
ICD-10-CM    1. Crohn's disease of colon with complication (H) K50.119 GASTROENTEROLOGY ADULT REF CONSULT ONLY    Take  prednisone 40 mg daily for two weeks, then 30 mg daily for one week, then 20 mg daily for one week, Then 15 mg daily for one week,  then 10 mg daily for one week then 5 mg Daily for one week.  Return for worsening, dehydration, increasing pain, distention. follow-up with GI [facility]. see referral.         Discharge Instructions for Crohn s Disease  You have been diagnosed with Crohn s disease. Your digestive tract is swollen and inflamed. All layers of your digestive tract may be affected. Although there is no cure for Crohn s disease, you can receive treatment for the symptoms. Help manage your symptoms by following your healthcare provider s advice and watching what you eat.  Home care  Recommendations for taking care of yourself at home include the following:    Work closely with your healthcare provider to determine the types of treatment that are best for you.    Take your medicines exactly as directed.  ? Let your healthcare provider know if you are having uncomfortable side effects.  ? Don t stop taking your medicines without talking with your healthcare provider first.    It may be helpful to avoid certain foods for a little while. Depending on your condition, these may include caffeine (coffee, tea, and cola), spicy foods, milk products, and raw fruits and vegetables. For certain people, these can be hard to digest and can worsen symptoms in a flare-up. Your healthcare provider may have you work with a nutritionist to come up with the best food choices for you.    Try eating several small meals a day instead of 3 large ones.    Don't smoke. Tobacco smoking makes the disease worse.     Keep appointments for regular checkups even if you are not having symptoms.    Talk to your healthcare provider about surgery for Crohn s disease. Surgery won t cure Crohn s disease, but it may help  "control the symptoms. Only you and your healthcare provider can decide if this choice is right for you.    Learn more about your condition. Contact the [facility] s [facility] [employer] toll-free at 830-019-2721 or www.ccfa.org  Managing nutrition  You may be able to eat most foods until you have a flare-up. But like anyone else, you need to make healthy eating choices. Some of the healthiest foods can make symptoms worse, though. Keeping track of your \"problem foods\" may be helpful. Ask your healthcare provider any questions you have about healthy eating.     When to call your healthcare provider  Call your healthcare provider right away if you have any of the following:    Severe pain or bloating in your belly after meals    Sores in your mouth    Sores in your anal area (around your rectum)    Fever of 100.4 F (38 C) or higher, or as directed by your healthcare provider    Poor appetite or weight loss    Bloody diarrhea    Nausea or vomiting    Skin rashes or skin that weeps (or drains)    Changes in your vision   Date Last Reviewed: 8/1/2016 2000-2018 [employer] [address] [place] [address], [place] [address]. All rights reserved. This information is not intended as a substitute for professional medical care. Always follow your healthcare professional's instructions.        "

## 2018-11-27 ENCOUNTER — OFFICE VISIT (OUTPATIENT)
Dept: GASTROENTEROLOGY | Facility: CLINIC | Age: 38
End: 2018-11-27
Payer: MEDICARE

## 2018-11-27 VITALS
OXYGEN SATURATION: 99 % | HEIGHT: 65 IN | BODY MASS INDEX: 21.99 KG/M2 | DIASTOLIC BLOOD PRESSURE: 56 MMHG | WEIGHT: 132 LBS | SYSTOLIC BLOOD PRESSURE: 95 MMHG | HEART RATE: 77 BPM

## 2018-11-27 DIAGNOSIS — K50.012 CROHN'S DISEASE OF SMALL INTESTINE WITH INTESTINAL OBSTRUCTION (H): Primary | ICD-10-CM

## 2018-11-27 DIAGNOSIS — R19.7 DIARRHEA, UNSPECIFIED TYPE: ICD-10-CM

## 2018-11-27 PROCEDURE — 99205 OFFICE O/P NEW HI 60 MIN: CPT | Performed by: INTERNAL MEDICINE

## 2018-11-27 ASSESSMENT — PAIN SCALES - GENERAL: PAINLEVEL: MILD PAIN (3)

## 2018-11-27 NOTE — PATIENT INSTRUCTIONS
Schedule colonoscopy. Call [phone] to schedule with Dr Mcbride     Submit labs and submit a stool sample before colonoscopy    Follow up in 4 weeks.    Will talk about which treatment (ustekinumab) and/or imuran    Continue prednisone as prescribed    Avoid NSAIDs (ibuprofen/Aleve)    Continue smoking cessation.

## 2018-11-27 NOTE — MR AVS SNAPSHOT
After Visit Summary   11/27/2018    Kim Harvey    MRN: [id number]           Patient Information     Date Of Birth          1980        Visit Information        Provider Department      11/27/2018 9:00 AM Marko Mcbride MD [facility]        Today's Diagnoses     Crohn's disease of small intestine with intestinal obstruction (H)    -  1    Diarrhea, unspecified type          Care Instructions    Schedule colonoscopy. Call [phone] to schedule with Dr Mcbride     Submit labs and submit a stool sample before colonoscopy    Follow up in 4 weeks.    Will talk about which treatment (ustekinumab) and/or imuran    Continue prednisone as prescribed    Avoid NSAIDs (ibuprofen/Aleve)    Continue smoking cessation.          Follow-ups after your visit        Additional Services     GASTROENTEROLOGY ADULT REF PROCEDURE ONLY [facility] [phone] ([place]); [facility]                 Follow-up notes from your care team     Return in about 4 weeks (around 12/25/2018).      Your next 10 appointments already scheduled     Jan 08, 2019  1:00 PM CST   Return Visit with Marko Mcbride MD   [facility] ([facility])    [address] [place] [id number] [phone]              Future tests that were ordered for you today     Open Future Orders        Priority Expected Expires Ordered    Tissue transglutaminase basilio IgA and IgG Routine  11/27/2019 11/27/2018    Hepatitis B surface antigen Routine  11/27/2019 11/27/2018    Hepatitis B Surface Antibody Routine  11/27/2019 11/27/2018    Hepatitis B core antibody Routine  11/27/2019 11/27/2018    Vitamin D Deficiency Routine 11/27/2018 11/27/2019 11/27/2018    Calprotectin Feces Routine  11/27/2019 11/27/2018    Clostridium difficile toxin B PCR Routine  12/27/2018 11/27/2018    Thiopurine Methyltransferase RBC Routine  11/27/2019 11/27/2018    Thiopurine  Methyltransferase Genotyping Routine 2018    M Tuberculosis by Quantiferon Routine  2019    GASTROENTEROLOGY ADULT REF PROCEDURE ONLY [facility] [phone] ([place]); [facility] GI Routine  2019    XR Chest 2 Views Routine 2018            Who to contact     If you have questions or need follow up information about today's clinic visit or your schedule please contact [facility] directly at [phone].  Normal or non-critical lab and imaging results will be communicated to you by [employer], letter or phone within 4 business days after the clinic has received the results. If you do not hear from us within 7 days, please contact the clinic through MyChart or phone. If you have a critical or abnormal lab result, we will notify you by phone as soon as possible.  Submit refill requests through [employer] or call your pharmacy and they will forward the refill request to us. Please allow 3 business days for your refill to be completed.          Additional Information About Your Visit        [employer] Information     [employer] is an electronic gateway that provides easy, online access to your medical records. With [employer], you can request a clinic appointment, read your test results, renew a prescription or communicate with your care team.     To sign up for [employer] visit the website at [email or web address]   You will be asked to enter the access code listed below, as well as some personal information. Please follow the directions to create your username and password.     Your access code is: [email or web address]  Expires: 2019 10:14 PM     Your access code will  in 90 days. If you need help or a new code, please contact your [facility] or call [phone] for assistance.        Care EveryWhere ID     This is your Care EveryWhere ID. This could be used by other organizations to  "access your [place] medical records  [id number]        Your Vitals Were     Pulse Height Pulse Oximetry BMI (Body Mass Index)          77 1.651 m (5' 5\") 99% 21.97 kg/m2         Blood Pressure from Last 3 Encounters:   11/27/18 95/56   11/23/18 97/57   04/17/17 122/85    Weight from Last 3 Encounters:   11/27/18 59.9 kg (132 lb)   11/23/18 60.3 kg (133 lb)   04/17/17 64.4 kg (142 lb)               Primary Care Provider Fax #    Physician No Ref-Primary [phone]       No address on file        Equal Access to Services     GERALD BERMUDEZ : Kishan Johnson, wadurga montiel, natalia kaalmada raoul, audrey huizar . So [facility] [phone].    ATENCIÓN: Si habla español, tiene a mandel disposición servicios gratuitos de asistencia lingüística. Llame al [phone].    We comply with applicable federal civil rights laws and [place] laws. We do not discriminate on the basis of race, color, national origin, age, disability, sex, sexual orientation, or gender identity.            Thank you!     Thank you for choosing [facility]  for your care. Our goal is always to provide you with excellent care. Hearing back from our patients is one way we can continue to improve our services. Please take a few minutes to complete the written survey that you may receive in the mail after your visit with us. Thank you!             Your Updated Medication List - Protect others around you: Learn how to safely use, store and throw away your medicines at www.disposemymeds.org.          This list is accurate as of 11/27/18  9:59 AM.  Always use your most recent med list.                   Brand Name Dispense Instructions for use Diagnosis    ADDERALL XR PO      Take 25 mg by mouth daily        ALEVE PO      Take 440 mg by mouth at onset of headache for moderate pain        ibuprofen 200 MG tablet    ADVIL/MOTRIN     Take 400 mg by mouth every 6 hours as needed for mild pain        " predniSONE 10 MG tablet    DELTASONE    106 tablet    prednisone 40 mg daily for two weeks, then 30 mg daily for one week, then 20 mg daily for one week, Then 15 mg daily for one week,  then 10 mg daily for one week then 5 mg Daily for one week

## 2018-11-29 NOTE — PROGRESS NOTES
GASTROENTEROLOGY NEW PATIENT CLINIC VISIT    CC/REFERRING MD:    No Ref-Primary, Physician  Werner Winters    REASON FOR CONSULTATION:   Werner Winters for   Chief Complaint   Patient presents with     Consult     Crohns        HISTORY OF PRESENT ILLNESS:    Kim Harvey is 38 year old female who presents for evaluation of Crohn's disease.  She reports a history of ileocolonic Crohn's disease since diagnosis in 2010 though she reports that her symptoms were present for several years prior to this diagnosis.  She has undergone several surgeries since her diagnosis including small bowel resections.  She is not sure on how much small bowel has been resected over this time.  She has been on several medications in the past for her Crohn's disease including Remicade which was discontinued due to low levels despite increased dosing to every other week, Humira which was discontinued due to knee and elbow joint pains and Cimzia which was discontinued due to seizures.  She reports that she was also on Imuran in the past but believes she may have gotten itching from this medication.  She has not been on methotrexate due to concerns that she may become pregnant as she does not use birth control.  She notes that recently she has been having increased stool frequency 10-16 bowel movements per day though the last few days it is been as little as 7 bowel movements per day.  She has been having bloating as well as nausea and vomiting and during this time she stops eating and then the pain spontaneously resolves after a few days..  She has been noting blood in her stool.  She reports that she has lost approximately 8 pounds in the last month.  She takes Aleve relatively frequently for joint pains.  She was recently seen in the emergency department for increasing symptoms.  A CT scan was performed on November 23, 2018 noting severe inflammation for 10 cm of the neoterminal ileum with less severe thickening extending 10-20 cm  beyond that.  There was the appearance of a possible partial small bowel obstruction as the bowel was dilated.  She was given a prescription for prednisone and is currently on an extended prednisone taper.  She previously has been a daily smoker but quit smoking 2 weeks ago.  She has a history of factor V Leiden gene mutation but does not take anticoagulation.    PROBLEM LIST  Patient Active Problem List    Diagnosis Date Noted     Other diseases of thymus (H) 04/22/2016     Priority: Medium     Exacerbation of Crohn's disease, other complication (H) 10/26/2015     Priority: Medium     Heterozygous factor V Leiden mutation 03/25/2015     Priority: Medium     MTHFR mutation 03/25/2015     Priority: Medium     Crohn's disease (H) 03/23/2015     Priority: Medium     Family hx-breast malignancy 09/22/2008     Priority: Medium     (Problem list name updated by automated process. Provider to review and confirm.)       Smoker 09/22/2008     Priority: Medium     Bronchitis, acute 09/22/2008     Priority: Medium     (Problem list name updated by automated process. Provider to review and confirm.)         PERTINENT PAST MEDICAL HISTORY:  (I personally reviewed this history with the patient at today's visit)  Past Medical History:   Diagnosis Date     1-5% of normal factor VIII (H)      Abscess of abdominal cavity (H)      Asthma      Chromosomal abnormality     MTHFR mutation     Crohn disease (H)      Crohn's disease (H) 2003    small intestine only     Depression      Factor V Leiden (H)     1-5% of normal Factor VIII     Factor V Leiden mutation (H)      Factor V Leiden mutation complicating pregnancy (H)      Fibromyalgia      IBS (irritable bowel syndrome)      Migraines          PREVIOUS SURGERIES: (I personally reviewed this history with the patient at today's visit)   Past Surgical History:   Procedure Laterality Date     ABDOMEN SURGERY       APPENDECTOMY      lap     C REMOVE MESH FROM ABD WALL FOR INFECTION        CHOLECYSTECTOMY       GYN SURGERY      cryosurgery     HC REMOVAL GALLBLADDER  2001     HERNIA REPAIR  3/2012    with Proceed mesh     PE TUBES  1989     SIGMOIDOSCOPY,BIOPSY  2/7/2012     SMALL INTESTINE SURGERY      ileocecectomy x2     TONSILLECTOMY  1995     TONSILLECTOMY           ALLERGIES:     Allergies   Allergen Reactions     Vancomycin Itching and Rash     Amoxicillin      Amoxicillin Hives            Antihistamine Allergy Relief [Benadryl Allergy] Other (See Comments)     RLS, restless body     Cefprozil Hives     Codeine Sulfate Difficulty breathing     Feels like she is being smothered     Compazine Difficulty breathing     Diphenhydramine Other (See Comments)     Restless legs and feet     Latex Hives     Morphine      Morphine Sulfate Hives and Difficulty breathing     OK with dilaudid and fentanyl.     Oxycodone-Acetaminophen      Penicillins      Penicillins Hives and Difficulty breathing     Percocet [Oxycodone-Acetaminophen] Other (See Comments), Nausea and Difficulty breathing     And Headache. Is ok with dilaudid, hydrocodone?, and fenanyl.       Prochlorperazine      Prochlorperazine      Reglan Nausea and Vomiting     Sulfa Drugs Swelling     Latex Hives, Itching and Rash       PERTINENT MEDICATIONS:    Current Outpatient Prescriptions:      Amphetamine-Dextroamphetamine (ADDERALL XR PO), Take 25 mg by mouth daily , Disp: , Rfl:      ibuprofen (ADVIL/MOTRIN) 200 MG tablet, Take 400 mg by mouth every 6 hours as needed for mild pain, Disp: , Rfl:      Naproxen Sodium (ALEVE PO), Take 440 mg by mouth at onset of headache for moderate pain, Disp: , Rfl:      predniSONE (DELTASONE) 10 MG tablet, prednisone 40 mg daily for two weeks, then 30 mg daily for one week, then 20 mg daily for one week, Then 15 mg daily for one week,  then 10 mg daily for one week then 5 mg Daily for one week, Disp: 106 tablet, Rfl: 0    SOCIAL HISTORY:  Social History     Social History     Marital status:       Spouse name: N/A     Number of children: N/A     Years of education: N/A     Occupational History     student      homemaker      unemployed      Social History Main Topics     Smoking status: Current Every Day Smoker     Packs/day: 0.50     Years: 12.00     Types: Cigarettes     Smokeless tobacco: Never Used      Comment: 12/23/15- taking chantix     Alcohol use No     Drug use: No     Sexual activity: Yes     Partners: Male     Birth control/ protection: None     Other Topics Concern     Parent/Sibling W/ Cabg, Mi Or Angioplasty Before 65f 55m? Yes     Mother age 46-stents     Social History Narrative    ** Merged History Encounter **            FAMILY HISTORY: (I personally reviewed this history with the patient at today's visit)  Family History   Problem Relation Age of Onset     Hypertension Maternal Grandmother      Heart Disease Maternal Grandmother      Diabetes Maternal Grandmother      Other - See Comments Maternal Grandmother      seizures     Liver Disease Mother      Heart Surgery Mother 46     stents     Cerebrovascular Disease Mother      Other Cancer Father      methasylioma     Cerebrovascular Disease Father      Crohn Disease Paternal Grandmother      Cancer Maternal Uncle      lung and prostate     Diabetes Maternal Uncle      Other - See Comments Maternal Uncle      mental illness        ROS:    No fevers or chills  No weight loss  No blurry vision, double vision or change in vision  No sore throat  No lymphadenopathy  No headache, paraesthesias, or weakness in a limb  No shortness of breath or wheezing  No chest pain or pressure  No arthralgias or myalgias  No rashes or skin changes  No odynophagia or dysphagia  No BRBPR, hematochezia, melena  No dysuria, frequency or urgency  No hot/cold intolerance or polyria  No anxiety or depression  PHYSICAL EXAMINATION:  Constitutional: aaox3, cooperative, pleasant, not dyspneic/diaphoretic, no acute distress  Vitals reviewed: BP 95/56  Pulse 77  Ht  "1.651 m (5' 5\")  Wt 59.9 kg (132 lb)  SpO2 99%  BMI 21.97 kg/m2  Wt:   Wt Readings from Last 2 Encounters:   11/27/18 59.9 kg (132 lb)   11/23/18 60.3 kg (133 lb)      Eyes: Sclera anicteric/injected  Ears/nose/mouth/throat: Normal oropharynx without ulcers or exudate, mucus membranes moist, hearing intact  Neck: supple, thyroid normal size  CV: No edema, RRR  Respiratory: Unlabored breathing, CTAB  Lymph: No submandibular, supraclavicular or inguinal lymphadenopathy  Abd:  Tender RLQ, Nondistended, no masses, +bs, no hepatosplenomegaly, nontender, no peritoneal signs  Skin: warm, perfused, no jaundice  Psych: Normal affect  MSK: Normal gait      PERTINENT STUDIES: (I personally reviewed these laboratory studies today)  Most recent CBC:   Recent Labs   Lab Test  11/23/18 1637  04/17/17 1925   WBC  7.1  7.3   HGB  13.7  12.6   HCT  41.9  37.9   PLT  324  291     Most recent hepatic panel:  Recent Labs   Lab Test  11/23/18 1637  04/17/17 1925   ALT  43  26   AST  30  16     Most recent creatinine:  Recent Labs   Lab Test  11/23/18 1637  04/17/17 1925   CR  0.60  0.63       RADIOLOGY:    CT ABDOMEN AND PELVIS WITH CONTRAST 11/23/2018 7:00 PM      HISTORY: Abdominal pain. Crohn's. Decreased BMI.     COMPARISON: April 17, 2017     TECHNIQUE: Volumetric helical acquisition of CT images from the lung  bases through the symphysis pubis after the administration of 65 mL  Isovue-370 intravenous contrast. Radiation dose for this scan was  reduced using automated exposure control, adjustment of the mA and/or  kV according to patient size, or iterative reconstruction technique.     FINDINGS: Marked inflammatory change in the neoterminal ileum,  slightly worse than the comparison study compatible with acute on  chronic Crohn's disease. Marked dilatation of small bowel proximal to  this, also considerably worse than previous. Wall thickening is severe  for approximately 10 cm, less severe wall thickening extends " probably  another 10-20 cm. No abscess. No free air. Low-attenuation  subcentimeter liver lesion(s) compatible with benign cysts or other  benign lesions. These are/is stable for at least one year and can be  considered benign. Kidneys, adrenal glands, spleen, pancreas  unremarkable. Normal caliber aorta. Trace peripheral fibrosis and/or  atelectasis in the lungs. No frankly destructive bony lesions.         IMPRESSION: Acute on chronic Crohn's disease, with severe inflammation  for the 10 cm involving the neoterminal ileum, with less severe wall  thickening affecting approximately another 10-20 cm and proximal small  bowel dilatation compatible with early or partial small bowel  obstruction.      (I personally reviewed the radiology images listed above)    ASSESSMENT/PLAN:    Kim Harvey is a 38 year old female who presents for evaluation of ileocolonic Crohn's disease.  She has a high risk phenotype notable for young age of onset, report of prior fistulization, smoking status (until 2 weeks ago) and is undergone multiple small bowel resections today.  She has failed multiple anti-TNF inhibitors including Cimzia, Humira and Remicade as described above and has had a possible side effect to Imuran (itching).  She is not currently on any therapy up until her recent ED visit when she is put on prednisone.  There is severe inflammation noting on a recent CT scan with concern of possible partial small bowel obstruction.      She will require reinitiation of therapy and restaging of her disease.  I recommended that she undergo a colonoscopy with monitored anesthesia care in the near future at [place].  We can see at that time if there is any need for dilation of any postsurgical stricture.  If the inflammation is not too severe, she will need dysplasia screening at that time.  We will then plan to work towards initiation of therapy.  I have ordered pre-biologic screening including hepatitis, QuantiFERON and x-ray  of the chest.  I would avoid further anti-TNF at this point given that she is failed multiple agents.  I think that her best option is likely ustekinumab though I would also consider vedolizumab.  Ideally, she would be on dual therapy with an immune modulator such as Imuran or methotrexate.  I would not advocate for methotrexate given that she is reporting that she cannot take any birth control or use any form of contraception.  After her colonoscopy, we will discuss further the possibility of ustekinumab plus minus Imuran therapy.  We can also consider checking with the [place] faculty to see if there are any ongoing clinical trials which she would qualify for.    Additional evaluation at this time includes labs and stool samples to exclude C. difficile and obtain Fecal Calprotectin.  Continue prednisone taper as prescribed, avoid NSAIDs and continue smoking cessation.    Crohn's disease of small intestine with intestinal obstruction (H)  Diarrhea, unspecified type  Orders Placed This Encounter   Procedures     Tissue transglutaminase basilio IgA and IgG     Standing Status:   Future     Standing Expiration Date:   11/27/2019     Hepatitis B surface antigen     Standing Status:   Future     Standing Expiration Date:   11/27/2019     Hepatitis B Surface Antibody     Standing Status:   Future     Standing Expiration Date:   11/27/2019     Hepatitis B core antibody     Standing Status:   Future     Standing Expiration Date:   11/27/2019     Vitamin D Deficiency     Standing Status:   Future     Standing Expiration Date:   11/27/2019     Calprotectin Feces     Standing Status:   Future     Standing Expiration Date:   11/27/2019     Thiopurine Methyltransferase RBC     Standing Status:   Future     Standing Expiration Date:   11/27/2019     Thiopurine Methyltransferase Genotyping     Standing Status:   Future     Standing Expiration Date:   11/27/2019     Order Specific Question:   Has Genetic Consent Form been signed?  (link to form below)     Answer:   Yes     M Tuberculosis by Quantiferon     Standing Status:   Future     Standing Expiration Date:   11/27/2019     Clostridium difficile toxin B PCR     Standing Status:   Future     Standing Expiration Date:   12/27/2018       RTC 4 weeks    Thank you for this consultation.  It was a pleasure to participate in the care of this patient; please contact us with any further questions.      This note was created with voice recognition software, and while reviewed for accuracy, typos may remain.     Marko Mcbride MD  Adjunct  of Medicine  Division of Gastroenterology, Hepatology and Nutrition  [facility]  [phone]

## 2019-03-14 ENCOUNTER — TELEPHONE (OUTPATIENT)
Dept: FAMILY MEDICINE | Facility: CLINIC | Age: 39
End: 2019-03-14